# Patient Record
Sex: MALE | URBAN - METROPOLITAN AREA
[De-identification: names, ages, dates, MRNs, and addresses within clinical notes are randomized per-mention and may not be internally consistent; named-entity substitution may affect disease eponyms.]

---

## 2020-06-10 ENCOUNTER — TELEPHONE (OUTPATIENT)
Dept: CARDIOLOGY CLINIC | Age: 55
End: 2020-06-10

## 2023-10-23 ENCOUNTER — TELEPHONE (OUTPATIENT)
Age: 58
End: 2023-10-23

## 2023-10-23 NOTE — TELEPHONE ENCOUNTER
Patient's spouse Mirna Logan called the office today requesting to schedule an appointment with Ellen BENTON. Patient is a previous [facility] patient and was last seen by [facility] on 4/28/22 for cerebral aneurysm and history of stroke. CTA head/neck obtained on 10/17/23 through [facility]. Appointment scheduled on Wednesday, 11/8/23 @ 1015 am with [facility]. Appointment reminder letter sent to patient via mail along with map/directions to our office. Nothing further needed at this time. All images requested from [facility] AND [facility] [place] and are in PACS for review.

## 2023-11-07 ENCOUNTER — CLINICAL DOCUMENTATION (OUTPATIENT)
Age: 58
End: 2023-11-07

## 2023-11-07 NOTE — PROGRESS NOTES
Received medical record request from a law firm for Social Security Disability/SSI. However, patient has not ever been seen through [facility] ([facility] Patient is previous [facility] patient seen through [facility]. Fax has been forwarded to their office to provide medical records as requested. Nothing further needed.

## 2023-11-08 ENCOUNTER — TELEPHONE (OUTPATIENT)
Age: 58
End: 2023-11-08

## 2023-11-08 NOTE — TELEPHONE ENCOUNTER
Cancellation/No-show Note  Patient Name:  Richie Kerr  :  1965   Date:  2023  Cancelled visits to date: 1  No-shows to date: 0    For today's appointment 23 @ 21  am with Jason HERNANDEZ-CNP, patient:   []  Cancelled  [x]  Rescheduled appointment  []  No-show     Reason given by patient:  []  Patient ill  []  Conflicting appointment  []  No transportation    []  Conflict with work  []  No reason given  [x]  Other:     Comments:  patient states he forgot about appointment. Rescheduled to 23 @ 145 pm with Jason Madison.     Electronically signed by:  Nicolás Yuan LPN, 69/3/8195, 79:39 AM

## 2023-11-09 NOTE — PROGRESS NOTES
Gait: deferred      LABS:   No results found for: \"WBC\", \"HGB\", \"HCT\", \"MCV\", \"PLT\"  No results found for: \"NA\", \"K\", \"CL\", \"CO2\", \"BUN\", \"CREATININE\", \"GLUCOSE\", \"CALCIUM\", \"PROT\", \"LABALBU\", \"BILITOT\", \"ALKPHOS\", \"AST\", \"ALT\", \"LABGLOM\", \"GFRAA\", \"AGRATIO\", \"GLOB\"  No results found for: \"CHOL\"  No results found for: \"TRIG\"  No results found for: \"HDL\"  No results found for: \"LDLCHOLESTEROL\", \"LDLCALC\"  No results found for: \"LABVLDL\", \"VLDL\"  No results found for: \"CHOLHDLRATIO\"      IMAGING:      CTA:    IMPRESSION:     Stable CT angiogram with no definitive aneurysm or significant change. Electronically Signed by: Viraj Viramontes MD, 10/17/2023 6:10 PM  Exam End: 10/17/23 16:56    Specimen Collected: 10/17/23 18:06 Last Resulted: 10/17/23 18:10   Received From: [facility]   Result Received: 11/03/23 10:56       ASSESSMENT/PLAN:     Cerebral aneurysm    S&S of aneurysm rupture discussed with the patient including worst headache of your life. Risk factors for aneurysm/rupture discussed with the patient including hypertension and smoking. Cerebral infundibulum vs small aneurysms not visualized on CTA. Cerebral angiogram is considered the gold standard and likely to small to be visualized on most recent CTA. With these stable findings I would recommend a repeat CTA head and neck in 1 year. Thank you for allowing us to participate in the care of your patient. If there are any questions regarding evaluation please feel free to contact us.      Linton Halsted, MARY - DIONNE, 11/9/2023

## 2023-11-16 ENCOUNTER — INITIAL CONSULT (OUTPATIENT)
Age: 58
End: 2023-11-16
Payer: COMMERCIAL

## 2023-11-16 VITALS
OXYGEN SATURATION: 96 % | DIASTOLIC BLOOD PRESSURE: 86 MMHG | BODY MASS INDEX: 32.77 KG/M2 | HEIGHT: 68 IN | WEIGHT: 216.25 LBS | SYSTOLIC BLOOD PRESSURE: 138 MMHG | RESPIRATION RATE: 16 BRPM | HEART RATE: 93 BPM

## 2023-11-16 DIAGNOSIS — I67.1 CEREBRAL ANEURYSM WITHOUT RUPTURE: Primary | ICD-10-CM

## 2023-11-16 PROCEDURE — G8484 FLU IMMUNIZE NO ADMIN: HCPCS | Performed by: NURSE PRACTITIONER

## 2023-11-16 PROCEDURE — 99203 OFFICE O/P NEW LOW 30 MIN: CPT | Performed by: NURSE PRACTITIONER

## 2023-11-16 PROCEDURE — G8427 DOCREV CUR MEDS BY ELIG CLIN: HCPCS | Performed by: NURSE PRACTITIONER

## 2023-11-16 PROCEDURE — G8417 CALC BMI ABV UP PARAM F/U: HCPCS | Performed by: NURSE PRACTITIONER

## 2023-11-16 PROCEDURE — 4004F PT TOBACCO SCREEN RCVD TLK: CPT | Performed by: NURSE PRACTITIONER

## 2023-11-16 PROCEDURE — 3017F COLORECTAL CA SCREEN DOC REV: CPT | Performed by: NURSE PRACTITIONER

## 2024-09-27 ENCOUNTER — TELEPHONE (OUTPATIENT)
Age: 59
End: 2024-09-27

## 2024-09-27 NOTE — TELEPHONE ENCOUNTER
Patient returned call to the office. Per patient request, would like CTA done through [facility]. Order has been faxed to [facility] central scheduling and confirmation fax scanned in media.

## 2024-12-02 ENCOUNTER — TELEPHONE (OUTPATIENT)
Age: 59
End: 2024-12-02

## 2024-12-02 NOTE — TELEPHONE ENCOUNTER
Lorraine with [facility] called the office stating CTA head/neck needs [id number].     Please call [facility] at [phone].   Tracking # [id number].

## 2024-12-03 NOTE — TELEPHONE ENCOUNTER
Left message for Lorraine at [facility] to return call to the office.

## 2024-12-04 NOTE — TELEPHONE ENCOUNTER
[facility] and informed her of authorization #'s for CTA head/neck. She states she will send a message to Central Scheduling for them to reach out to patient to schedule. Nothing further needed.

## 2025-06-23 LAB
ANION GAP SERPL CALCULATED.3IONS-SCNC: 9 MMOL/L (ref 7–16)
BUN BLDV-MCNC: 13 MG/DL (ref 7–25)
CALCIUM SERPL-MCNC: 9 MG/DL (ref 8.6–10.2)
CHLORIDE BLD-SCNC: 105 MMOL/L (ref 98–107)
CO2: 22 MMOL/L (ref 21–31)
CREAT SERPL-MCNC: 1.14 MG/DL (ref 0.7–1.3)
EGFR MALE: 75 ML/MIN/1.73M2
GLUCOSE BLD-MCNC: 215 MG/DL (ref 74–109)
POTASSIUM SERPL-SCNC: 3.7 MMOL/L (ref 3.5–5.1)
SODIUM BLD-SCNC: 136 MMOL/L (ref 136–145)

## 2025-06-24 LAB
ANION GAP SERPL CALCULATED.3IONS-SCNC: 8 MMOL/L (ref 7–16)
BASOPHILS ABSOLUTE: 0.1 K/UL (ref 0–0.1)
BASOPHILS RELATIVE PERCENT: 0.8 %
BUN BLDV-MCNC: 10 MG/DL (ref 7–25)
CALCIUM SERPL-MCNC: 8.5 MG/DL (ref 8.6–10.2)
CHLORIDE BLD-SCNC: 107 MMOL/L (ref 98–107)
CO2: 23 MMOL/L (ref 21–31)
CREAT SERPL-MCNC: 1.06 MG/DL (ref 0.7–1.3)
EGFR MALE: 82 ML/MIN/1.73M2
EOSINOPHILS ABSOLUTE: 0.2 K/UL (ref 0–0.4)
EOSINOPHILS RELATIVE PERCENT: 2.3 %
GLUCOSE BLD-MCNC: 149 MG/DL (ref 74–109)
HCT VFR BLD CALC: 36.5 % (ref 39–51.5)
HEMOGLOBIN: 12.5 G/DL (ref 13.1–17.6)
LYMPHOCYTES ABSOLUTE: 1.7 K/UL (ref 0.8–3.6)
LYMPHOCYTES RELATIVE PERCENT: 22.8 %
MCH RBC QN AUTO: 30.9 PG (ref 28.4–33.4)
MCHC RBC AUTO-ENTMCNC: 34.4 G/DL (ref 31.1–37)
MCV RBC AUTO: 89.9 FL (ref 85–99)
MONOCYTES ABSOLUTE: 0.5 K/UL (ref 0.3–0.9)
MONOCYTES RELATIVE PERCENT: 6.2 %
NEUTROPHILS ABSOLUTE: 5.2 K/UL (ref 2–7.3)
NEUTROPHILS RELATIVE PERCENT: 67.9 %
PDW BLD-RTO: 13.7 % (ref 11.7–15.2)
PLATELET # BLD: 174 K/UL (ref 154–393)
POTASSIUM SERPL-SCNC: 3.4 MMOL/L (ref 3.5–5.1)
RBC # BLD: 4.05 M/UL (ref 4.3–5.86)
SODIUM BLD-SCNC: 138 MMOL/L (ref 136–145)
WBC # BLD: 7.7 K/UL (ref 4–10.5)